# Patient Record
Sex: FEMALE
[De-identification: names, ages, dates, MRNs, and addresses within clinical notes are randomized per-mention and may not be internally consistent; named-entity substitution may affect disease eponyms.]

---

## 2019-06-15 ENCOUNTER — HOSPITAL ENCOUNTER (INPATIENT)
Dept: HOSPITAL 92 - ERS | Age: 69
LOS: 6 days | Discharge: NURSING, REHAB OR LONG-TERM CARE | DRG: 482 | End: 2019-06-21
Attending: SURGERY | Admitting: SURGERY
Payer: SELF-PAY

## 2019-06-15 VITALS — BODY MASS INDEX: 31.8 KG/M2

## 2019-06-15 DIAGNOSIS — R11.2: ICD-10-CM

## 2019-06-15 DIAGNOSIS — R42: ICD-10-CM

## 2019-06-15 DIAGNOSIS — Z90.49: ICD-10-CM

## 2019-06-15 DIAGNOSIS — I10: ICD-10-CM
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
## 2019-06-15 DIAGNOSIS — Z85.038: ICD-10-CM
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
## 2019-06-15 DIAGNOSIS — Z96.653: ICD-10-CM

## 2019-06-15 DIAGNOSIS — S72.351A: Primary | ICD-10-CM

## 2019-06-15 LAB
ANION GAP SERPL CALC-SCNC: 19 MMOL/L (ref 10–20)
APTT PPP: 26.3 SEC (ref 22.9–36.1)
BASOPHILS # BLD AUTO: 0 THOU/UL (ref 0–0.2)
BASOPHILS NFR BLD AUTO: 0.2 % (ref 0–1)
BUN SERPL-MCNC: 20 MG/DL (ref 9.8–20.1)
CALCIUM SERPL-MCNC: 9.3 MG/DL (ref 7.8–10.44)
CHLORIDE SERPL-SCNC: 105 MMOL/L (ref 98–107)
CO2 SERPL-SCNC: 19 MMOL/L (ref 23–31)
CREAT CL PREDICTED SERPL C-G-VRATE: 0 ML/MIN (ref 70–130)
EOSINOPHIL # BLD AUTO: 0.1 THOU/UL (ref 0–0.7)
EOSINOPHIL NFR BLD AUTO: 0.5 % (ref 0–10)
GLUCOSE SERPL-MCNC: 128 MG/DL (ref 80–115)
HGB BLD-MCNC: 9.3 G/DL (ref 12–16)
INR PPP: 1
LYMPHOCYTES # BLD: 1.9 THOU/UL (ref 1.2–3.4)
LYMPHOCYTES NFR BLD AUTO: 10.2 % (ref 21–51)
MCH RBC QN AUTO: 20 PG (ref 27–31)
MCV RBC AUTO: 65.7 FL (ref 78–98)
MDIFF COMPLETE?: YES
MICROCYTES BLD QL SMEAR: (no result) (100X)
MONOCYTES # BLD AUTO: 1.3 THOU/UL (ref 0.11–0.59)
MONOCYTES NFR BLD AUTO: 7 % (ref 0–10)
NEUTROPHILS # BLD AUTO: 15.2 THOU/UL (ref 1.4–6.5)
NEUTROPHILS NFR BLD AUTO: 82.1 % (ref 42–75)
PLATELET # BLD AUTO: 435 THOU/UL (ref 130–400)
POTASSIUM SERPL-SCNC: 3.9 MMOL/L (ref 3.5–5.1)
PROTHROMBIN TIME: 13.7 SEC (ref 12–14.7)
RBC # BLD AUTO: 4.65 MILL/UL (ref 4.2–5.4)
SODIUM SERPL-SCNC: 139 MMOL/L (ref 136–145)
WBC # BLD AUTO: 18.6 THOU/UL (ref 4.8–10.8)

## 2019-06-15 PROCEDURE — 0QH836Z INSERTION OF INTRAMEDULLARY INTERNAL FIXATION DEVICE INTO RIGHT FEMORAL SHAFT, PERCUTANEOUS APPROACH: ICD-10-PCS | Performed by: ORTHOPAEDIC SURGERY

## 2019-06-15 PROCEDURE — 85730 THROMBOPLASTIN TIME PARTIAL: CPT

## 2019-06-15 PROCEDURE — 84100 ASSAY OF PHOSPHORUS: CPT

## 2019-06-15 PROCEDURE — 85610 PROTHROMBIN TIME: CPT

## 2019-06-15 PROCEDURE — 86900 BLOOD TYPING SEROLOGIC ABO: CPT

## 2019-06-15 PROCEDURE — 96375 TX/PRO/DX INJ NEW DRUG ADDON: CPT

## 2019-06-15 PROCEDURE — 93005 ELECTROCARDIOGRAM TRACING: CPT

## 2019-06-15 PROCEDURE — 80048 BASIC METABOLIC PNL TOTAL CA: CPT

## 2019-06-15 PROCEDURE — 83735 ASSAY OF MAGNESIUM: CPT

## 2019-06-15 PROCEDURE — 86901 BLOOD TYPING SEROLOGIC RH(D): CPT

## 2019-06-15 PROCEDURE — P9016 RBC LEUKOCYTES REDUCED: HCPCS

## 2019-06-15 PROCEDURE — 96374 THER/PROPH/DIAG INJ IV PUSH: CPT

## 2019-06-15 PROCEDURE — 71045 X-RAY EXAM CHEST 1 VIEW: CPT

## 2019-06-15 PROCEDURE — 86850 RBC ANTIBODY SCREEN: CPT

## 2019-06-15 PROCEDURE — 51702 INSERT TEMP BLADDER CATH: CPT

## 2019-06-15 PROCEDURE — 85025 COMPLETE CBC W/AUTO DIFF WBC: CPT

## 2019-06-15 PROCEDURE — C1769 GUIDE WIRE: HCPCS

## 2019-06-15 PROCEDURE — 36430 TRANSFUSION BLD/BLD COMPNT: CPT

## 2019-06-15 PROCEDURE — 87086 URINE CULTURE/COLONY COUNT: CPT

## 2019-06-15 PROCEDURE — 76000 FLUOROSCOPY <1 HR PHYS/QHP: CPT

## 2019-06-15 PROCEDURE — G0390 TRAUMA RESPONS W/HOSP CRITI: HCPCS

## 2019-06-15 PROCEDURE — 36415 COLL VENOUS BLD VENIPUNCTURE: CPT

## 2019-06-15 PROCEDURE — C1713 ANCHOR/SCREW BN/BN,TIS/BN: HCPCS

## 2019-06-15 NOTE — RAD
PORTABLE CHEST:

6/15/19

 

HISTORY: 

Preoperative evaluation.

 

COMPARISON: 

5/20/12.

 

Heart is mildly enlarged. There is mild vascular engorgement, similar to the prior exam. No infiltrat
e or significant effusion. 

 

IMPRESSION: 

Cardiomegaly with mild vascular engorgement. No acute process apparent. 

 

POS: [facility]

## 2019-06-15 NOTE — RAD
RIGHT FEMUR:

6/15/19

 

Two views.

 

HISTORY: 

Fall.

 

FINDINGS/IMPRESSION: 

There is a comminuted displaced fracture involving the proximal diaphysis of the femur. 

 

POS: [facility]

## 2019-06-16 RX ADMIN — DOCUSATE SODIUM 50 MG AND SENNOSIDES 8.6 MG SCH TAB: 8.6; 5 TABLET, FILM COATED ORAL at 20:34

## 2019-06-16 RX ADMIN — Medication SCH MG: at 08:42

## 2019-06-16 RX ADMIN — DOCUSATE SODIUM 50 MG AND SENNOSIDES 8.6 MG SCH TAB: 8.6; 5 TABLET, FILM COATED ORAL at 08:42

## 2019-06-16 RX ADMIN — CEFAZOLIN SODIUM SCH MLS: 2 SOLUTION INTRAVENOUS at 17:53

## 2019-06-16 NOTE — RAD
TWO VIEWS OF THE RIGHT FEMUR:

 

COMPARISON: 

6/15/2019.

 

HISTORY: 

Femur fracture.

 

FINDINGS/IMPRESSION: 

Limited intraoperative fluoroscopic views of the right femur were submitted for interpretation.  The 
patient is status post antegrade intramedullary hamzah fixation spanning the mid femur fracture.  A pros
thesis is also seen in the knee.

 

POS: [facility]

## 2019-06-16 NOTE — HP
TRAUMA SURGEON:  Dr. Hogue.



CONSULTING PHYSICIAN:  Dr. Andrew.



HISTORY OF PRESENT ILLNESS:  The patient is a 68-year-old female, who presented to

the emergency department via EMS after a slip on some water at the mall where she

fell, there was no loss of consciousness.  She complained of right thigh pain.  On

evaluation by the emergency department, it was noted that she had a right midshaft

femur fracture.  She denies numbness or tingling.  Denies nausea, vomiting, and

diarrhea.  Reports pain is well controlled whenever she does not move. 



REVIEW OF SYSTEMS:  All additional 10-point review of systems negative except as

indicated above. 



PAST MEDICAL HISTORY:  Hypertension and colon cancer, for which she had surgery, but

no chemo or radiation. 



PAST SURGICAL HISTORY:  Bilateral knee replacements and partial colectomy for colon

cancer. 



SOCIAL HISTORY:  The patient denies tobacco, drug, or alcohol use.  She is from

[place] and does not speak English well.  Her family is very involved in her care. 



MEDICATIONS:  Lisinopril 20 mg once a day.



ALLERGIES:  NO KNOWN DRUG ALLERGIES.



PHYSICAL EXAMINATION:

VITAL SIGNS:  Temperature 98.6, pulse 73, respirations 18, oxygen saturation 100% on

room air, blood pressure 138/72. 



PRIMARY SURVEY: 

Airway intact. 

Adequate breath sounds bilaterally. 

2+ pulses in the bilateral radials, femorals, and DPs. 

GCS is 15.  Gross motor and sensation intact. 

No laceration, bruising, or external bleeding. 



SECONDARY SURVEY: 

HEAD:  Normocephalic and atraumatic.  No gross palpable skull deformities. 

EYES:  Pupils are equal, round, and reactive to light bilaterally. 

ENT:  No hemotympanum.  No epistaxis.  No septal hematoma.  Midface stable to

manipulation.  No blood in the oropharynx.  Dentition is intact.  No anterior neck

injury/crepitus/tenderness. 

C-SPINE:  No step-offs or deformities, nontender.  C-collar not in place. 

CHEST:  Nontender.  No crepitus.  No abrasions or ecchymosis.  Equal movement. 

ABDOMEN:  Soft, nontender, and nondistended. 

PELVIS:  Stable to palpation, nontender.  No abrasions or ecchymosis. 

RECTAL:  Deferred. 

GENITOURINARY:  Allison in place with clear yellow urine in bag. 

EXTREMITIES:  No gross deformities.  Tenderness to the right thigh.  No abrasions or

ecchymosis.  2+ pulses in the bilateral radials, femorals, and DPs. 

BACK/SPINE:  No step-offs, deformities, or tenderness to palpation of her T or

L-spine. 

NEUROLOGIC:  5/5 strength in the bilateral , plantar flexion, and dorsiflexion.

 Gross normal sensation x4 extremities. 



LABORATORY FINDINGS:  White count 18.6, hemoglobin 9.3, hematocrit 30.5, platelets

435.  INR 1.0.  Sodium 138, potassium 3.9, chloride 105, carbon dioxide 19, BUN 20,

creatinine 0.83, glucose 128. 



DIAGNOSTIC FINDINGS:  X-ray of the right femur demonstrates there is a comminuted

displaced fracture involving the proximal diaphysis of the femur.  Chest x-ray

demonstrates cardiomegaly with mild vascular engorgement, no acute process apparent. 



ASSESSMENT:  

1. A 68-year-old status post mechanical fall from standing.

2. Right midshaft femur fracture.

3. History of colon cancer and hypertension.



PLAN:  The patient will be admitted to the trauma floor service.  Dr. Andrew of

Orthopedic Surgery was consulted and saw the patient.  We will plan to take the

patient to the OR tomorrow morning.  She will be n.p.o. after midnight.  She will

receive IV and oral pain medications.  We will not give her IV fluids at this time

due to mild vascular engorgement seen on the chest x-ray, but we will closely

monitor her urinary output.  We will restart her home medications as clinically

indicated.  The patient to work with PT and OT postoperatively.  She will likely

need placement at acute rehab as she ambulates independently and has a full active

life before this accident.  The patient was discussed with Dr. Hogue before this

dictation. 







Job ID:  [id number]

## 2019-06-16 NOTE — PRG
DATE OF SERVICE:  06/16/2019



SUBJECTIVE:  The patient was seen this morning, lying in bed.  Right lower extremity

with traction at the ankle.  The patient reported she did not have any pain as long

as she laid still.  She has been n.p.o. for operative fixation today with Dr. Andrew of the right midshaft femur fracture.  The patient was tearful this

morning and is worried about her recovery.  She has had bilateral knee replacements

before and understands the difficulty with therapy afterwards, and she is a little

apprehensive, but reports that she will feel better after the surgery has been

completed.  She is of  descent and does not speak English well, but family at

bedside has been easily translating for us.  Family is very involved in the

patient's care as well.  She denies nausea, vomiting, or diarrhea. 



OBJECTIVE:  VITAL SIGNS:  Temperature 98.7, pulse 79, respirations 16, oxygen

saturation 97% on room air, and blood pressure 145/79. 

GENERAL:  Well-appearing elderly female, lying in bed with no signs of acute

distress. 

PULMONARY:  Equal chest rise and fall.  Clear breath sounds bilaterally.  No signs

of acute respiratory distress. 

CARDIAC:  Regular rate and rhythm.  No murmurs, gallops, or rubs. 

GI:  Abdomen is soft, nontender, nondistended. 

EXTREMITIES:  2+ pulses in all extremities.  No significant swelling noted.  Gross

motor and sensation intact in all extremities. 

NEUROLOGIC:  GCS is 15.  Gross motor and sensation intact.



LABORATORY FINDINGS:  There are no new laboratory findings to discuss.



ASSESSMENT:  

1. Status post mechanical fall from standing.

2. Right femoral neck fracture.

3. History of hypertension and colon cancer.



PLAN:  The patient is to go to the OR today with Dr. Andrew of Orthopedic Surgery

for fixation of the right midshaft femur fracture.  The patient is to receive

physical and 

occupational therapy afterwards.  She can have a regular diet, and she does not need

any IV fluids.  We will hold chemo-DVT prophylaxis until tomorrow.  She will likely

need 

placement in a rehab facility for further therapy.  The patient was discussed with

Dr. Hogue this morning after rounds. 







Job ID:  [id number]

## 2019-06-16 NOTE — CON
DATE OF CONSULTATION:  



CHIEF COMPLAINT:  Right leg pain.



HISTORY OF PRESENT ILLNESS:  Ms. Greenberg is a 68-year-old female, who [place]

[place]all today.  She slipped and fell.  She landed hard on her right leg.  She had pain

and swelling.  She was unable to ambulate.  She was taken to the emergency

department by EMS.  She was found to have a comminuted and displaced midshaft femur

fracture.  Orthopedics was consulted for this injury.  This appears to be her only

injury.  She does not have other complaints.  At baseline, she ambulates well with

no walker.  She does have a history of knee arthroplasty bilaterally.  She has been

in good health.  She does not remember loss of consciousness or dizziness. 



PAST MEDICAL HISTORY:  Hypertension and history of colon cancer.



PAST SURGICAL HISTORY:  Colon cancer treatment.



ALLERGIES:  NO KNOWN DRUG ALLERGIES.



MEDICATIONS:  She takes a hypertension medication.  No blood thinners.



FAMILY HISTORY:  Family medical history; noncontributory.



SOCIAL HISTORY:  The patient denies tobacco, alcohol, or drug use.  She lives with

family. 



PHYSICAL EXAMINATION:

VITAL SIGNS:  Stable.  She is afebrile.  Alert and oriented, no apparent distress. 

RESPIRATORY:  Breathing comfortably. 

ABDOMEN:  Soft, nontender, and nondistended. 

MUSCULOSKELETAL:  The right leg is in an externally rotated and shortened position.

She has a well-healed scar over the anterior knee.  She is neurovascularly intact in

the foot and ankle.  She has a palpable pulse.  Left leg is atraumatic as well as

upper extremities. 



IMAGING:  X-rays of the right femur demonstrate a comminuted and displaced midshaft

femur fracture with shortening.  There is a total knee arthroplasty that appears to

be intact and well-fixed. 



IMPRESSION:  Right midshaft femur fracture in an elderly female after a fall.



PLAN:  The patient will be admitted to the [facility].  She will need to go to

the operating room tomorrow morning for intramedullary nail fixation of her femur.

I think we can plan for intramedullary nail from the antegrade position and stay

away from her total knee arthroplasty.  The knee replacement appears to be intact.

The goal of surgery is to provide pain relief, realign the fracture, and promote

healing.  Risks to include infection, pain, scarring, nerve or vascular injury, DVT,

PE, hardware failure, nonunion, and others.  She should be n.p.o. at midnight.  She

will have appropriate DVT prophylaxis and antibiotic prophylaxis. 







Job ID:  [id number]

## 2019-06-17 LAB
ANION GAP SERPL CALC-SCNC: 9 MMOL/L (ref 10–20)
BASOPHILS # BLD AUTO: 0 THOU/UL (ref 0–0.2)
BASOPHILS NFR BLD AUTO: 0.1 % (ref 0–1)
BUN SERPL-MCNC: 16 MG/DL (ref 9.8–20.1)
CALCIUM SERPL-MCNC: 8.5 MG/DL (ref 7.8–10.44)
CHLORIDE SERPL-SCNC: 109 MMOL/L (ref 98–107)
CO2 SERPL-SCNC: 26 MMOL/L (ref 23–31)
CREAT CL PREDICTED SERPL C-G-VRATE: 83 ML/MIN (ref 70–130)
EOSINOPHIL # BLD AUTO: 0 THOU/UL (ref 0–0.7)
EOSINOPHIL NFR BLD AUTO: 0.2 % (ref 0–10)
GLUCOSE SERPL-MCNC: 128 MG/DL (ref 80–115)
HGB BLD-MCNC: 7.4 G/DL (ref 12–16)
LYMPHOCYTES # BLD: 1.5 THOU/UL (ref 1.2–3.4)
LYMPHOCYTES NFR BLD AUTO: 14.1 % (ref 21–51)
MAGNESIUM SERPL-MCNC: 1.9 MG/DL (ref 1.6–2.6)
MCH RBC QN AUTO: 20.1 PG (ref 27–31)
MCV RBC AUTO: 65.5 FL (ref 78–98)
MONOCYTES # BLD AUTO: 1 THOU/UL (ref 0.11–0.59)
MONOCYTES NFR BLD AUTO: 9.1 % (ref 0–10)
NEUTROPHILS # BLD AUTO: 8.3 THOU/UL (ref 1.4–6.5)
NEUTROPHILS NFR BLD AUTO: 76.5 % (ref 42–75)
PLATELET # BLD AUTO: 383 THOU/UL (ref 130–400)
POTASSIUM SERPL-SCNC: 4.3 MMOL/L (ref 3.5–5.1)
RBC # BLD AUTO: 3.66 MILL/UL (ref 4.2–5.4)
SODIUM SERPL-SCNC: 140 MMOL/L (ref 136–145)
WBC # BLD AUTO: 10.8 THOU/UL (ref 4.8–10.8)

## 2019-06-17 RX ADMIN — CEFAZOLIN SODIUM SCH MLS: 2 SOLUTION INTRAVENOUS at 02:47

## 2019-06-17 RX ADMIN — DOCUSATE SODIUM 50 MG AND SENNOSIDES 8.6 MG SCH TAB: 8.6; 5 TABLET, FILM COATED ORAL at 21:03

## 2019-06-17 RX ADMIN — DOCUSATE SODIUM 50 MG AND SENNOSIDES 8.6 MG SCH TAB: 8.6; 5 TABLET, FILM COATED ORAL at 08:51

## 2019-06-17 RX ADMIN — Medication SCH MG: at 08:51

## 2019-06-17 NOTE — HP
ADDENDUM:  For full details, please see the H and P dictated by Martine Barreto

trauma PA.  I verified the details with this and discussed the diagnosis and

treatment plan with Ms. Barreto and agree with that plan as documented in her

note.  In short, Ms. Greenberg is a 68-year-old woman, who slipped on a wet floor at

the mall and fell breaking her right femur.  She has already been to the operating

room and I saw her in recovery.  Complete examination performed personally did not

reveal any other evidence of trauma nor was any reported.  She has a past medical

history of colon cancer and has had a colectomy for this, but this does not appear

to be a pathologic fracture.  She will be seen and evaluated by Physical Therapy and

have a rehab screening for inpatient rehab. 







Job ID:  [id number]

## 2019-06-17 NOTE — PRG
DATE OF SERVICE:  06/17/2019



SUBJECTIVE:  Ms. Greenberg is a 68-year-old female, presenting with right hip fracture

after a mechanical fall.  She is postop day #1, status post intramedullary nail by

Dr. Andrew.  Reports she needs more pain medications for adequate pain control,

currently tramadol is p.r.n. and she has not been asking for a p.r.n.  She is

tolerating regular diet, still has Allison catheter in place.  Denies nausea or

vomiting. 



OBJECTIVE:  VITAL SIGNS:  Blood pressure 123/66, temperature 98.3, pulse 83,

respirations 18, and SpO2 is 96% on room air. 

GENERAL:  Alert and oriented x3, in no acute distress. 

NECK:  Supple.  Trachea midline. 

RESPIRATORY:  Clear to auscultation bilaterally.  No respiratory distress. 

CARDIAC:  Regular rate and rhythm.  No murmurs. 

ABDOMEN: Soft, nontender, nondistended.  Bowel sounds present. 

EXTREMITIES:  Radial pulses 2+.  No gross deformities.



LABORATORY FINDINGS:  White blood cell count 10.8, hemoglobin 7.4, and MCV 65.5.

Glucose 128. 



DIAGNOSTIC FINDINGS:  There are no new diagnostics to review.



ASSESSMENT:  

1. Mechanical fall from standing.

2. Right midshaft femur fracture.

3. History of colon cancer.

4. Hypertension.



PLAN:  The patient is postop day #1 status post intramedullary nail placement by Dr. Andrew, Orthopedic Surgery.  We will schedule the patient's tramadol and have

p.r.n. available to better control patient's pain.  Tylenol is now scheduled p.o.

We will start Lovenox for DVT prophylaxis.  We will also remove the Allison today and

monitor for urinary output with Is and Os.  She is currently on senna and MiraLAX

for bowel regimen.  She has not had a bowel movement since admission.  We will

restart her home medications as indicated, blood pressure is currently controlled

off antihypertensives.  The patient will continue to work with PT and OT.  Referral

was sent to [facility] for rehab. 



This patient was seen and evaluated by Dr. Mejía during morning rounds.  The plan

was discussed with the patient and family, who are in agreement. 







Job ID:  [id number]

## 2019-06-17 NOTE — RAD
RIGHT FEMUR:

6/15/19

 

Four views.

 

INDICATIONS:

Trauma. 

 

There is a comminuted displaced fracture involving the  mid shaft of the femur. 

 

Prosthesis noted at the knee which appears unremarkable. Hip appears unremarkable. 

 

IMPRESSION: 

Comminuted displaced fracture mid shaft right femur. 

 

POS: [facility]

## 2019-06-18 LAB
ANION GAP SERPL CALC-SCNC: 10 MMOL/L (ref 10–20)
BASOPHILS # BLD AUTO: 0.1 THOU/UL (ref 0–0.2)
BASOPHILS NFR BLD AUTO: 0.6 % (ref 0–1)
BUN SERPL-MCNC: 21 MG/DL (ref 9.8–20.1)
CALCIUM SERPL-MCNC: 8.3 MG/DL (ref 7.8–10.44)
CHLORIDE SERPL-SCNC: 110 MMOL/L (ref 98–107)
CO2 SERPL-SCNC: 23 MMOL/L (ref 23–31)
CREAT CL PREDICTED SERPL C-G-VRATE: 85 ML/MIN (ref 70–130)
EOSINOPHIL # BLD AUTO: 0.6 THOU/UL (ref 0–0.7)
EOSINOPHIL NFR BLD AUTO: 6.3 % (ref 0–10)
GLUCOSE SERPL-MCNC: 105 MG/DL (ref 80–115)
HGB BLD-MCNC: 6.9 G/DL (ref 12–16)
LYMPHOCYTES # BLD: 3.2 THOU/UL (ref 1.2–3.4)
LYMPHOCYTES NFR BLD AUTO: 31.4 % (ref 21–51)
MAGNESIUM SERPL-MCNC: 1.9 MG/DL (ref 1.6–2.6)
MCH RBC QN AUTO: 19.9 PG (ref 27–31)
MCV RBC AUTO: 66 FL (ref 78–98)
MONOCYTES # BLD AUTO: 0.9 THOU/UL (ref 0.11–0.59)
MONOCYTES NFR BLD AUTO: 8.6 % (ref 0–10)
NEUTROPHILS # BLD AUTO: 5.4 THOU/UL (ref 1.4–6.5)
NEUTROPHILS NFR BLD AUTO: 53.1 % (ref 42–75)
PLATELET # BLD AUTO: 348 THOU/UL (ref 130–400)
POTASSIUM SERPL-SCNC: 4 MMOL/L (ref 3.5–5.1)
RBC # BLD AUTO: 3.45 MILL/UL (ref 4.2–5.4)
SODIUM SERPL-SCNC: 139 MMOL/L (ref 136–145)
WBC # BLD AUTO: 10.1 THOU/UL (ref 4.8–10.8)

## 2019-06-18 PROCEDURE — 30233N1 TRANSFUSION OF NONAUTOLOGOUS RED BLOOD CELLS INTO PERIPHERAL VEIN, PERCUTANEOUS APPROACH: ICD-10-PCS | Performed by: SURGERY

## 2019-06-18 RX ADMIN — DOCUSATE SODIUM 50 MG AND SENNOSIDES 8.6 MG SCH TAB: 8.6; 5 TABLET, FILM COATED ORAL at 21:05

## 2019-06-18 RX ADMIN — ONDANSETRON PRN MG: 2 INJECTION INTRAMUSCULAR; INTRAVENOUS at 09:05

## 2019-06-18 RX ADMIN — DOCUSATE SODIUM 50 MG AND SENNOSIDES 8.6 MG SCH TAB: 8.6; 5 TABLET, FILM COATED ORAL at 09:17

## 2019-06-18 RX ADMIN — ONDANSETRON PRN MG: 2 INJECTION INTRAMUSCULAR; INTRAVENOUS at 18:43

## 2019-06-18 RX ADMIN — SCOPALAMINE PRN MG: 1 PATCH, EXTENDED RELEASE TRANSDERMAL at 21:06

## 2019-06-18 RX ADMIN — Medication SCH MG: at 09:15

## 2019-06-18 NOTE — PRG
DATE OF SERVICE:  06/18/2019



SUBJECTIVE:  Ms. Greenberg is a 68-year-old female, who presented with right hip

fracture after mechanical fall.  She is postop day #2, status post intramedullary

nail by Dr. Andrew.  She reports pain is now adequately controlled, but she does

endorse some throat soreness post extubation from surgery.  She is tolerating

regular diet.  Allison catheter was removed yesterday and she is urinating.  Her

hemoglobin was noted to be 6.9 this morning and she will have a transfusion, and 1

unit of packed red blood cells was ordered, but she is experiencing dizziness and

weakness.  She has been attempting to use her incentive spirometry, but is too dizzy

right now to use it.  Teaching was given on how to use the incentive spirometer as

it appeared she was having trouble using it. 



OBJECTIVE:  VITAL SIGNS:  Blood pressure 155/87, temperature 97.6, pulse 99,

respiratory rate 18, and SpO2 of 96% on room air. 

GENERAL:  Alert and oriented x3, in some distress due to dizziness. 

NECK:  Supple.  Trachea midline. 

RESPIRATORY:  Diffuse expiratory wheezing.  No respiratory distress. 

CARDIAC:  Regular rate and rhythm.  No murmurs. 

ABDOMEN:  Soft, nontender, and nondistended.  Bowel sounds present. 

EXTREMITIES:  Radial pulse 2+.  No gross deformities. 

SKIN:  Dressing over incision site.



LABORATORY FINDINGS:  Hemoglobin 6.9 and white blood cell count 10.1.



DIAGNOSTIC FINDINGS:  No new diagnostics to review.



ASSESSMENT:  

1. Mechanical fall from standing.

2. Right midshaft femur fracture.

3. History of colon cancer.

4. Hypertension.



PLAN:  The patient is postop day #2 status post intramedullary nail placement by Dr. Andrew, Orthopedic Surgery.  The patient's pain is now well controlled with the

addition of scheduled tramadol.  She is currently on Lovenox for DVT prophylaxis.

Allison was removed yesterday and she is urinating on her own.  She is currently on

senna and MiraLAX for bowel regimen and has not had bowel movement since admission.

We will restart her home medications as indicated, blood pressure is currently

controlled off antihypertensives.  The patient will continue to work with PT and OT.

 Referral was sent for [facility].  She will receive 1 unit of packed red blood

cells today for symptomatic anemia with hemoglobin of 6.9.  Encourage her to use

incentive spirometer. 



The patient was seen and evaluated by Dr. Mejía during morning rounds.  Plan was

discussed with the patient and family, who are in agreement. 







Job ID:  [id number]

## 2019-06-19 LAB
ANION GAP SERPL CALC-SCNC: 12 MMOL/L (ref 10–20)
BASOPHILS # BLD AUTO: 0.1 THOU/UL (ref 0–0.2)
BASOPHILS NFR BLD AUTO: 0.5 % (ref 0–1)
BUN SERPL-MCNC: 12 MG/DL (ref 9.8–20.1)
CALCIUM SERPL-MCNC: 8.8 MG/DL (ref 7.8–10.44)
CHLORIDE SERPL-SCNC: 106 MMOL/L (ref 98–107)
CO2 SERPL-SCNC: 24 MMOL/L (ref 23–31)
CREAT CL PREDICTED SERPL C-G-VRATE: 98 ML/MIN (ref 70–130)
EOSINOPHIL # BLD AUTO: 0.5 THOU/UL (ref 0–0.7)
EOSINOPHIL NFR BLD AUTO: 3.7 % (ref 0–10)
GLUCOSE SERPL-MCNC: 137 MG/DL (ref 80–115)
HGB BLD-MCNC: 8.7 G/DL (ref 12–16)
LYMPHOCYTES # BLD: 2.3 THOU/UL (ref 1.2–3.4)
LYMPHOCYTES NFR BLD AUTO: 18.6 % (ref 21–51)
MAGNESIUM SERPL-MCNC: 1.8 MG/DL (ref 1.6–2.6)
MCH RBC QN AUTO: 21.2 PG (ref 27–31)
MCV RBC AUTO: 66.5 FL (ref 78–98)
MONOCYTES # BLD AUTO: 0.8 THOU/UL (ref 0.11–0.59)
MONOCYTES NFR BLD AUTO: 6.2 % (ref 0–10)
NEUTROPHILS # BLD AUTO: 8.7 THOU/UL (ref 1.4–6.5)
NEUTROPHILS NFR BLD AUTO: 71 % (ref 42–75)
PLATELET # BLD AUTO: 398 THOU/UL (ref 130–400)
POTASSIUM SERPL-SCNC: 4 MMOL/L (ref 3.5–5.1)
RBC # BLD AUTO: 4.09 MILL/UL (ref 4.2–5.4)
SODIUM SERPL-SCNC: 138 MMOL/L (ref 136–145)
WBC # BLD AUTO: 12.2 THOU/UL (ref 4.8–10.8)

## 2019-06-19 RX ADMIN — Medication SCH MG: at 08:38

## 2019-06-19 RX ADMIN — DOCUSATE SODIUM 50 MG AND SENNOSIDES 8.6 MG SCH: 8.6; 5 TABLET, FILM COATED ORAL at 20:04

## 2019-06-19 RX ADMIN — DOCUSATE SODIUM 50 MG AND SENNOSIDES 8.6 MG SCH TAB: 8.6; 5 TABLET, FILM COATED ORAL at 08:40

## 2019-06-19 RX ADMIN — ONDANSETRON PRN MG: 2 INJECTION INTRAMUSCULAR; INTRAVENOUS at 05:02

## 2019-06-19 NOTE — PRG
DATE OF SERVICE:  06/19/2019



SUBJECTIVE:  Ms. Greenberg is a 68-year-old female, who presented with right hip

fracture after mechanical fall.  She is postop day #3, status post intramedullary

nail by Dr. Andrew.  She reports pain is adequately controlled and is tolerating

a regular diet.  She is experiencing dizziness like the room is spinning.  Denies

any diplopia.  She received a transfusion of 1 unit packed red blood cells yesterday

after her hemoglobin was noted to be 6.9.  She said this did improve her symptoms of

blacking out and improved her dizziness a little bit.  The scopolamine patch also of

benefit this morning. 



OBJECTIVE:  VITAL SIGNS:  Blood pressure 148/82, temperature 99.3, pulse 93,

respirations 20, and SpO2 97% on room air. 

GENERAL:  Alert and oriented, fatigued this morning after dose of Phenergan. 

HEENT:  Normocephalic and atraumatic. 

NECK:  Supple.  Trachea midline. 

RESPIRATORY:  No respiratory distress. 

ABDOMEN:  Soft, nontender, and nondistended.  Bowel sounds present. 

EXTREMITIES:  Radial pulses 2+.  No gross deformities. 

SKIN:  Dressing over incision site.



LABORATORY DATA:  White blood cell count 12.2, hemoglobin 8.7, hematocrit 27.2,

glucose 137, phosphorus 2.0, and magnesium 1.8. 



DIAGNOSTIC IMAGING:  No new diagnostics to review.



ASSESSMENT:  

1. Mechanical fall from standing.

2. Right midshaft femur fracture.

3. History of colon cancer.

4. Dizziness.

5. Hypertension.



PLAN:  The patient is postop day #3 status post intramedullary nail placement by Dr. Andrew, Orthopedic Surgery.  The patient's pain is well controlled and she is

currently on Lovenox for DVT prophylaxis.  She is receiving senna and MiraLAX for

bowel regimen and had 2 bowel movements overnight.  She is currently on scopolamine

patch for dizziness.  This has given her some improvement, but will add meclizine

p.r.n. in addition to scopolamine.  We will discontinue Phenergan due to little

added benefit and the patient's drowsiness making it more difficult for her to

participate in PT and OT. 



We will restart her home medications as her blood pressure was elevated today.  The

patient will continue to work with PT and OT.  Referral was sent for [facility].  Case Management has been contacting the mall regarding insurance coverage as

the patient slipped and fell on a wet floor in the mall.  Continue to encourage the

patient to use incentive spirometer. 



The patient was seen and evaluated by Dr. Mejía during morning rounds.  Plan was

discussed with the patient and family, who are in agreement with the plan. 







Job ID:  [id number]

## 2019-06-20 LAB
ANION GAP SERPL CALC-SCNC: 11 MMOL/L (ref 10–20)
BASOPHILS # BLD AUTO: 0.1 THOU/UL (ref 0–0.2)
BASOPHILS NFR BLD AUTO: 0.5 % (ref 0–1)
BUN SERPL-MCNC: 12 MG/DL (ref 9.8–20.1)
CALCIUM SERPL-MCNC: 8.7 MG/DL (ref 7.8–10.44)
CHLORIDE SERPL-SCNC: 107 MMOL/L (ref 98–107)
CO2 SERPL-SCNC: 25 MMOL/L (ref 23–31)
CREAT CL PREDICTED SERPL C-G-VRATE: 102 ML/MIN (ref 70–130)
EOSINOPHIL # BLD AUTO: 0.6 THOU/UL (ref 0–0.7)
EOSINOPHIL NFR BLD AUTO: 6.1 % (ref 0–10)
GLUCOSE SERPL-MCNC: 90 MG/DL (ref 80–115)
HGB BLD-MCNC: 7.9 G/DL (ref 12–16)
LYMPHOCYTES # BLD: 2.1 THOU/UL (ref 1.2–3.4)
LYMPHOCYTES NFR BLD AUTO: 20.3 % (ref 21–51)
MAGNESIUM SERPL-MCNC: 2 MG/DL (ref 1.6–2.6)
MCH RBC QN AUTO: 20.8 PG (ref 27–31)
MCV RBC AUTO: 67.4 FL (ref 78–98)
MICROCYTES BLD QL SMEAR: (no result) (100X)
MONOCYTES # BLD AUTO: 0.8 THOU/UL (ref 0.11–0.59)
MONOCYTES NFR BLD AUTO: 7.6 % (ref 0–10)
NEUTROPHILS # BLD AUTO: 6.9 THOU/UL (ref 1.4–6.5)
NEUTROPHILS NFR BLD AUTO: 65.5 % (ref 42–75)
PLATELET # BLD AUTO: 365 THOU/UL (ref 130–400)
POLYCHROMASIA BLD QL SMEAR: (no result) (100X)
POTASSIUM SERPL-SCNC: 4 MMOL/L (ref 3.5–5.1)
RBC # BLD AUTO: 3.8 MILL/UL (ref 4.2–5.4)
SCHISTOCYTES BLD QL SMEAR: (no result) (100X)
SODIUM SERPL-SCNC: 139 MMOL/L (ref 136–145)
WBC # BLD AUTO: 10.5 THOU/UL (ref 4.8–10.8)

## 2019-06-20 RX ADMIN — DOCUSATE SODIUM 50 MG AND SENNOSIDES 8.6 MG SCH TAB: 8.6; 5 TABLET, FILM COATED ORAL at 20:59

## 2019-06-20 RX ADMIN — DOCUSATE SODIUM 50 MG AND SENNOSIDES 8.6 MG SCH: 8.6; 5 TABLET, FILM COATED ORAL at 10:11

## 2019-06-20 RX ADMIN — Medication SCH MG: at 09:18

## 2019-06-20 NOTE — PRG
DATE OF SERVICE:  06/20/2019



SUBJECTIVE:  Ms. Greenberg is a 68-year-old female, presenting with right hip fracture

after mechanical fall.  She is postop day #4, status post intramedullary nail by Dr. Andrew.  Reports pain is well controlled and tolerating regular diet.  Her

dizziness is very mild and it has improved with scopolamine patch and Antivert.

This has not been preventing her from working with physical and occupational

therapy.  She is up sitting in her chair. 



OBJECTIVE:  VITAL SIGNS:  Blood pressure 141/81, temperature 97.7, pulse 83,

respirations 14, and SpO2 of 93% on room air. 

GENERAL:  Alert and oriented, in no acute distress, sitting in chair. 

HEENT:  Normocephalic, atraumatic. 

NECK:  Supple.  Trachea midline. 

RESPIRATORY:  Her lungs are clear to auscultation bilaterally.  No respiratory

distress. 

CARDIAC:  Regular rate and rhythm.  No murmurs. 

ABDOMEN:  Soft, nontender, and nondistended.  Bowel sounds present. 

EXTREMITIES:  Radial pulses 2+.  No gross deformities.



LABORATORY DATA:  No new labs to review.



DIAGNOSTIC IMAGING:  No new diagnostics to review.



ASSESSMENT:  

1. Mechanical fall from standing.

2. Right midshaft femur fracture.

3. History of colon cancer.

4. Dizziness.

5. Hypertension.



PLAN:  The patient is postop day #4, status post intramedullary nail placement by

Dr. Andrew, Orthopedic Surgery, after hip fracture.  The patient's pain is well

controlled and she is currently on Lovenox for DVT prophylaxis.  She is receiving

senna and MiraLAX for bowel regimen.  In regard to her dizziness, this is improved

with scopolamine patch and Antivert p.r.n.  She is working well with physical and

occupational therapy, and Case Management is working on approval for [facility]

as the patient's picture is complicated by the accident happening in shopping mall.

We have continued to encourage the patient to use incentive spirometer at least

every hour and to sit up in the chair to help with her breathing and cough. 



The patient was discussed with Dr. Mejía during morning rounds.  The plan was

discussed with the patient and family, who were in agreement with the plan. 







Job ID:  [id number]

## 2019-06-21 VITALS — SYSTOLIC BLOOD PRESSURE: 119 MMHG | DIASTOLIC BLOOD PRESSURE: 73 MMHG | TEMPERATURE: 98.1 F

## 2019-06-21 RX ADMIN — DOCUSATE SODIUM 50 MG AND SENNOSIDES 8.6 MG SCH TAB: 8.6; 5 TABLET, FILM COATED ORAL at 09:58

## 2019-06-21 RX ADMIN — Medication SCH MG: at 09:58

## 2019-06-21 RX ADMIN — SCOPALAMINE PRN MG: 1 PATCH, EXTENDED RELEASE TRANSDERMAL at 11:42

## 2019-06-21 NOTE — DIS
DATE OF ADMISSION:  06/15/2019



DATE OF DISCHARGE:  06/21/2019



ADMISSION DIAGNOSES:  

1. Status post mechanical fall from standing.

2. Right midshaft femur fracture.

3. Acute traumatic pain.

4. History of colon cancer.

5. History of hypertension.



DISCHARGE DIAGNOSES:  

1. Status post mechanical fall from standing.

2. Right midshaft femur fracture.

3. Acute traumatic pain.

4. History of colon cancer.

5. History of hypertension.

6. Vertigo.

7. Acute blood loss anemia.



CONSULTANTS:  Dr. Andrew, Orthopedic Surgery.



HOSPITAL COURSE:  Ms. Greenberg is a 68-year-old female, who presented to [facility]gency room after a slip and fall reportedly on water at the mall.  She was seen

and evaluated and found to have the above injuries.  She was taken for operative

intervention on 06/15/2019.  Postoperatively, the patient did well.  She was

tolerating a general diet.  She was working on physical therapy.  She did have

multiple bouts of nausea and vomiting associated with vertigo for which she was

given scopolamine and Antivert.  This improved her symptoms dramatically.  The

patient was evaluated and accepted to inpatient rehabilitation.  Bed was available

and she was medically stable on 06/21/2019. 



DISCHARGE DISPOSITION:  Inpatient rehab.



DISCHARGE CONDITION:  Good.



PHYSICAL EXAMINATION:

VITAL SIGNS:  Temperature 98.1, pulse 87, respirations 14, O2 saturation 98% on room

air, and blood pressure 119/73. 

GENERAL:  Resting in bed, in no acute distress.  Eating breakfast. 

PULMONARY:  Normal work of breathing.  Symmetric rise. 

CARDIOVASCULAR:  Regular rate and rhythm. 

GI:  Abdomen is soft, nontender, and nondistended. 

MUSCULOSKELETAL:  Moves all extremities x4. 

NEURO:  No focal deficit is noted.



DISCHARGE INSTRUCTIONS:  Discharge instructions were provided to the patient in the

accepting facility.  She is partial weightbearing on her right lower extremity.  She

should keep her orthopedic wounds clean and dry.  Continue to work with Physical

Therapy and Occupational therapy.  She has no dietary restrictions. 



DISCHARGE MEDICATIONS:  As documented in the electronic medical record list, which

was provided to the accepting facility. 



FOLLOWUP APPOINTMENTS:  The patient is follow up with her primary care provider as

needed for chronic medical illnesses to include possible vertigo.  She is to follow

up with Orthopedic Surgery in approximately 10 days.  She does not need to follow up

formally with Trauma Services, but may call our office with any questions.  This is

merely a summary of the patient's hospitalization.  For more in-depth information,

please see her medical record in its entirety. 







Job ID:  [id number]

## 2019-06-22 NOTE — EKG
Test Reason : 

Blood Pressure : ***/*** mmHG

Vent. Rate : 088 BPM     Atrial Rate : 088 BPM

   P-R Int : 134 ms          QRS Dur : 090 ms

    QT Int : 374 ms       P-R-T Axes : 065 023 051 degrees

   QTc Int : 452 ms

 

Normal sinus rhythm

Left atrial enlargement

ST abnormality, possible digitalis effect

Abnormal ECG

 

Confirmed by NUHA LEACH, ALICE RIOS ([id number]),  CHARITY REES ([id number]) on 6/22/2019 11:53:49 AM

 

Referred By:             Confirmed By:ALICE BREEN MD

## 2021-08-23 ENCOUNTER — HOSPITAL ENCOUNTER (EMERGENCY)
Dept: HOSPITAL 92 - CSHERS | Age: 71
Discharge: HOME | End: 2021-08-23
Payer: MEDICARE

## 2021-08-23 DIAGNOSIS — R42: ICD-10-CM

## 2021-08-23 DIAGNOSIS — Z85.038: ICD-10-CM

## 2021-08-23 DIAGNOSIS — R19.7: ICD-10-CM

## 2021-08-23 DIAGNOSIS — R10.84: ICD-10-CM

## 2021-08-23 DIAGNOSIS — R11.10: Primary | ICD-10-CM

## 2021-08-23 DIAGNOSIS — I10: ICD-10-CM

## 2021-08-23 DIAGNOSIS — Z79.899: ICD-10-CM

## 2021-08-23 DIAGNOSIS — Z20.822: ICD-10-CM

## 2021-08-23 LAB
ALBUMIN SERPL BCG-MCNC: 3.7 G/DL (ref 3.4–4.8)
ALP SERPL-CCNC: 72 U/L (ref 40–110)
ALT SERPL W P-5'-P-CCNC: 12 U/L (ref 8–55)
ANION GAP SERPL CALC-SCNC: 15 MMOL/L (ref 10–20)
ANISOCYTOSIS BLD QL SMEAR: (no result) (100X)
AST SERPL-CCNC: 18 U/L (ref 5–34)
BACTERIA UR QL AUTO: (no result) HPF
BASOPHILS # BLD AUTO: 0 10X3/UL (ref 0–0.2)
BASOPHILS NFR BLD AUTO: 0.3 % (ref 0–2)
BILIRUB SERPL-MCNC: 0.3 MG/DL (ref 0.2–1.2)
BUN SERPL-MCNC: 13 MG/DL (ref 9.8–20.1)
CALCIUM SERPL-MCNC: 9 MG/DL (ref 7.8–10.44)
CHLORIDE SERPL-SCNC: 109 MMOL/L (ref 98–107)
CO2 SERPL-SCNC: 18 MMOL/L (ref 23–31)
CREAT CL PREDICTED SERPL C-G-VRATE: 0 ML/MIN (ref 70–130)
EOSINOPHIL # BLD AUTO: 0 10X3/UL (ref 0–0.5)
EOSINOPHIL NFR BLD AUTO: 0.2 % (ref 0–6)
GLOBULIN SER CALC-MCNC: 4.1 G/DL (ref 2.4–3.5)
GLUCOSE SERPL-MCNC: 131 MG/DL (ref 83–110)
HGB BLD-MCNC: 11 G/DL (ref 12–15.5)
LYMPHOCYTES NFR BLD AUTO: 12.1 % (ref 18–47)
MCH RBC QN AUTO: 19.3 PG (ref 27–33)
MCV RBC AUTO: 65.7 FL (ref 81.6–98.3)
MICROCYTES BLD QL SMEAR: (no result) (100X)
MONOCYTES # BLD AUTO: 0.5 10X3/UL (ref 0–1.1)
MONOCYTES NFR BLD AUTO: 4.5 % (ref 0–10)
MUCOUS THREADS UR QL AUTO: (no result) LPF
NEUTROPHILS # BLD AUTO: 8.5 10X3/UL (ref 1.5–8.4)
NEUTROPHILS NFR BLD AUTO: 82.1 % (ref 40–75)
PLATELET # BLD AUTO: 372 10X3/UL (ref 150–450)
POTASSIUM SERPL-SCNC: 4.1 MMOL/L (ref 3.5–5.1)
PROT UR STRIP.AUTO-MCNC: 30 MG/DL
RBC # BLD AUTO: 5.69 10X6/UL (ref 3.9–5.03)
RBC UR QL AUTO: (no result) HPF (ref 0–3)
SODIUM SERPL-SCNC: 138 MMOL/L (ref 136–145)
SP GR UR STRIP: 1.02 (ref 1–1.04)
WBC # BLD AUTO: 10.3 10X3/UL (ref 3.5–10.5)
WBC UR QL AUTO: (no result) HPF (ref 0–3)

## 2021-08-23 PROCEDURE — 81003 URINALYSIS AUTO W/O SCOPE: CPT

## 2021-08-23 PROCEDURE — 93005 ELECTROCARDIOGRAM TRACING: CPT

## 2021-08-23 PROCEDURE — 81015 MICROSCOPIC EXAM OF URINE: CPT

## 2021-08-23 PROCEDURE — U0005 INFEC AGEN DETEC AMPLI PROBE: HCPCS

## 2021-08-23 PROCEDURE — 80053 COMPREHEN METABOLIC PANEL: CPT

## 2021-08-23 PROCEDURE — 36415 COLL VENOUS BLD VENIPUNCTURE: CPT

## 2021-08-23 PROCEDURE — 85025 COMPLETE CBC W/AUTO DIFF WBC: CPT

## 2021-08-23 PROCEDURE — U0003 INFECTIOUS AGENT DETECTION BY NUCLEIC ACID (DNA OR RNA); SEVERE ACUTE RESPIRATORY SYNDROME CORONAVIRUS 2 (SARS-COV-2) (CORONAVIRUS DISEASE [COVID-19]), AMPLIFIED PROBE TECHNIQUE, MAKING USE OF HIGH THROUGHPUT TECHNOLOGIES AS DESCRIBED BY CMS-2020-01-R: HCPCS

## 2023-12-16 ENCOUNTER — HOSPITAL ENCOUNTER (INPATIENT)
Dept: HOSPITAL 92 - ERS | Age: 73
LOS: 6 days | Discharge: HOME | DRG: 481 | End: 2023-12-22
Attending: SURGERY | Admitting: SURGERY
Payer: COMMERCIAL

## 2023-12-16 VITALS — BODY MASS INDEX: 33.6 KG/M2
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
## 2023-12-16 DIAGNOSIS — J98.11: ICD-10-CM

## 2023-12-16 DIAGNOSIS — D62: ICD-10-CM

## 2023-12-16 DIAGNOSIS — R19.7: ICD-10-CM

## 2023-12-16 DIAGNOSIS — Z90.49: ICD-10-CM

## 2023-12-16 DIAGNOSIS — Z79.899: ICD-10-CM

## 2023-12-16 DIAGNOSIS — Z96.653: ICD-10-CM

## 2023-12-16 DIAGNOSIS — S72.22XA: Primary | ICD-10-CM

## 2023-12-16 DIAGNOSIS — E87.70: ICD-10-CM

## 2023-12-16 DIAGNOSIS — K43.9: ICD-10-CM

## 2023-12-16 DIAGNOSIS — F39: ICD-10-CM

## 2023-12-16 DIAGNOSIS — Z85.038: ICD-10-CM

## 2023-12-16 DIAGNOSIS — E86.0: ICD-10-CM

## 2023-12-16 DIAGNOSIS — Z98.890: ICD-10-CM

## 2023-12-16 DIAGNOSIS — I10: ICD-10-CM

## 2023-12-16 LAB
ALBUMIN SERPL BCG-MCNC: 4.1 G/DL (ref 3.4–4.8)
ALP SERPL-CCNC: 73 U/L (ref 40–110)
ALT SERPL W P-5'-P-CCNC: 19 U/L (ref 8–55)
ANION GAP SERPL CALC-SCNC: 17 MMOL/L (ref 10–20)
ANISOCYTOSIS BLD QL SMEAR: (no result) HPF (ref 0–5)
APTT PPP: 27.8 SEC (ref 22.9–36.1)
AST SERPL-CCNC: 20 U/L (ref 5–34)
BASOPHILS # BLD AUTO: 0.1 THOU/UL (ref 0–0.2)
BASOPHILS NFR BLD AUTO: 0.3 % (ref 0–1)
BILIRUB SERPL-MCNC: 0.3 MG/DL (ref 0.2–1.2)
BUN SERPL-MCNC: 15 MG/DL (ref 9.8–20.1)
BURR CELLS BLD QL SMEAR: (no result) HPF (ref 0–1)
CALCIUM SERPL-MCNC: 9.8 MG/DL (ref 7.8–10.44)
CELLAVISION OPERATOR ID: (no result)
CHLORIDE SERPL-SCNC: 106 MMOL/L (ref 98–107)
CO2 SERPL-SCNC: 18 MMOL/L (ref 23–31)
CREAT CL PREDICTED SERPL C-G-VRATE: 0 ML/MIN (ref 70–130)
EOSINOPHIL # BLD AUTO: 0.2 THOU/UL (ref 0–0.7)
EOSINOPHIL NFR BLD AUTO: 1.3 % (ref 0–10)
GLOBULIN SER CALC-MCNC: 3.9 G/DL (ref 2.4–3.5)
GLUCOSE SERPL-MCNC: 168 MG/DL (ref 83–110)
HCT VFR BLD CALC: 30.5 % (ref 36–47)
HGB BLD-MCNC: 8.3 G/DL (ref 12–16)
INR PPP: 1.1
LYMPHOCYTES NFR BLD AUTO: 16 % (ref 21–51)
MCH RBC QN AUTO: 17.1 PG (ref 27–31)
MCV RBC AUTO: 63 FL (ref 78–98)
MICROCYTES BLD QL SMEAR: (no result) HPF (ref 0–5)
MONOCYTES # BLD AUTO: 0.8 THOU/UL (ref 0.11–0.59)
MONOCYTES NFR BLD AUTO: 4.7 % (ref 0–10)
NEUTROPHILS # BLD AUTO: 13.3 THOU/UL (ref 1.4–6.5)
NEUTROPHILS NFR BLD AUTO: 76 % (ref 42–75)
PLATELET # BLD AUTO: 556 10X3/UL (ref 130–400)
POLYCHROMASIA BLD QL SMEAR: (no result) HPF (ref 0–2)
POTASSIUM SERPL-SCNC: 3.8 MMOL/L (ref 3.5–5.1)
PROTHROMBIN TIME: 14.2 SEC (ref 12–14.7)
RBC # BLD AUTO: 4.84 MILL/UL (ref 4.2–5.4)
SODIUM SERPL-SCNC: 137 MMOL/L (ref 136–145)
WBC # BLD AUTO: 17.5 10X3/UL (ref 4.8–10.8)

## 2023-12-16 PROCEDURE — 86850 RBC ANTIBODY SCREEN: CPT

## 2023-12-16 PROCEDURE — 85610 PROTHROMBIN TIME: CPT

## 2023-12-16 PROCEDURE — 80053 COMPREHEN METABOLIC PANEL: CPT

## 2023-12-16 PROCEDURE — 93005 ELECTROCARDIOGRAM TRACING: CPT

## 2023-12-16 PROCEDURE — 76705 ECHO EXAM OF ABDOMEN: CPT

## 2023-12-16 PROCEDURE — 72170 X-RAY EXAM OF PELVIS: CPT

## 2023-12-16 PROCEDURE — 74177 CT ABD & PELVIS W/CONTRAST: CPT

## 2023-12-16 PROCEDURE — 85730 THROMBOPLASTIN TIME PARTIAL: CPT

## 2023-12-16 PROCEDURE — G0390 TRAUMA RESPONS W/HOSP CRITI: HCPCS

## 2023-12-16 PROCEDURE — 83880 ASSAY OF NATRIURETIC PEPTIDE: CPT

## 2023-12-16 PROCEDURE — 36415 COLL VENOUS BLD VENIPUNCTURE: CPT

## 2023-12-16 PROCEDURE — 86900 BLOOD TYPING SEROLOGIC ABO: CPT

## 2023-12-16 PROCEDURE — 94640 AIRWAY INHALATION TREATMENT: CPT

## 2023-12-16 PROCEDURE — 80048 BASIC METABOLIC PNL TOTAL CA: CPT

## 2023-12-16 PROCEDURE — P9016 RBC LEUKOCYTES REDUCED: HCPCS

## 2023-12-16 PROCEDURE — 71260 CT THORAX DX C+: CPT

## 2023-12-16 PROCEDURE — 96375 TX/PRO/DX INJ NEW DRUG ADDON: CPT

## 2023-12-16 PROCEDURE — 93010 ELECTROCARDIOGRAM REPORT: CPT

## 2023-12-16 PROCEDURE — 93970 EXTREMITY STUDY: CPT

## 2023-12-16 PROCEDURE — 83735 ASSAY OF MAGNESIUM: CPT

## 2023-12-16 PROCEDURE — 86901 BLOOD TYPING SEROLOGIC RH(D): CPT

## 2023-12-16 PROCEDURE — 71045 X-RAY EXAM CHEST 1 VIEW: CPT

## 2023-12-16 PROCEDURE — 83036 HEMOGLOBIN GLYCOSYLATED A1C: CPT

## 2023-12-16 PROCEDURE — 36430 TRANSFUSION BLD/BLD COMPNT: CPT

## 2023-12-16 PROCEDURE — 84100 ASSAY OF PHOSPHORUS: CPT

## 2023-12-16 PROCEDURE — 82378 CARCINOEMBRYONIC ANTIGEN: CPT

## 2023-12-16 PROCEDURE — 74018 RADEX ABDOMEN 1 VIEW: CPT

## 2023-12-16 PROCEDURE — 96374 THER/PROPH/DIAG INJ IV PUSH: CPT

## 2023-12-16 PROCEDURE — C1713 ANCHOR/SCREW BN/BN,TIS/BN: HCPCS

## 2023-12-16 PROCEDURE — 85025 COMPLETE CBC W/AUTO DIFF WBC: CPT

## 2023-12-17 LAB
ALBUMIN SERPL BCG-MCNC: 3.2 G/DL (ref 3.4–4.8)
ALP SERPL-CCNC: 61 U/L (ref 40–110)
ALT SERPL W P-5'-P-CCNC: 16 U/L (ref 8–55)
ANION GAP SERPL CALC-SCNC: 12 MMOL/L (ref 10–20)
AST SERPL-CCNC: 25 U/L (ref 5–34)
BASOPHILS # BLD AUTO: 0 THOU/UL (ref 0–0.2)
BASOPHILS NFR BLD AUTO: 0.4 % (ref 0–1)
BILIRUB SERPL-MCNC: 0.6 MG/DL (ref 0.2–1.2)
BUN SERPL-MCNC: 13 MG/DL (ref 9.8–20.1)
CALCIUM SERPL-MCNC: 9.1 MG/DL (ref 7.8–10.44)
CHLORIDE SERPL-SCNC: 108 MMOL/L (ref 98–107)
CO2 SERPL-SCNC: 23 MMOL/L (ref 23–31)
CREAT CL PREDICTED SERPL C-G-VRATE: 77 ML/MIN (ref 70–130)
EOSINOPHIL # BLD AUTO: 0.2 THOU/UL (ref 0–0.7)
EOSINOPHIL NFR BLD AUTO: 2.5 % (ref 0–10)
GLOBULIN SER CALC-MCNC: 3.4 G/DL (ref 2.4–3.5)
GLUCOSE SERPL-MCNC: 133 MG/DL (ref 83–110)
HCT VFR BLD CALC: 25.9 % (ref 36–47)
HGB BLD-MCNC: 7.2 G/DL (ref 12–16)
LYMPHOCYTES NFR BLD AUTO: 30.3 % (ref 21–51)
MCH RBC QN AUTO: 17.2 PG (ref 27–31)
MCV RBC AUTO: 61.8 FL (ref 78–98)
MONOCYTES # BLD AUTO: 0.8 THOU/UL (ref 0.11–0.59)
MONOCYTES NFR BLD AUTO: 8.6 % (ref 0–10)
NEUTROPHILS # BLD AUTO: 5.3 THOU/UL (ref 1.4–6.5)
NEUTROPHILS NFR BLD AUTO: 57.3 % (ref 42–75)
PLATELET # BLD AUTO: 430 10X3/UL (ref 130–400)
POTASSIUM SERPL-SCNC: 4.1 MMOL/L (ref 3.5–5.1)
RBC # BLD AUTO: 4.19 MILL/UL (ref 4.2–5.4)
SODIUM SERPL-SCNC: 139 MMOL/L (ref 136–145)
WBC # BLD AUTO: 9.3 10X3/UL (ref 4.8–10.8)

## 2023-12-17 PROCEDURE — 30233N1 TRANSFUSION OF NONAUTOLOGOUS RED BLOOD CELLS INTO PERIPHERAL VEIN, PERCUTANEOUS APPROACH: ICD-10-PCS | Performed by: SURGERY

## 2023-12-17 PROCEDURE — 0QS706Z REPOSITION LEFT UPPER FEMUR WITH INTRAMEDULLARY INTERNAL FIXATION DEVICE, OPEN APPROACH: ICD-10-PCS | Performed by: ORTHOPAEDIC SURGERY

## 2023-12-17 RX ADMIN — Medication SCH: at 10:13

## 2023-12-18 LAB
ANISOCYTOSIS BLD QL SMEAR: (no result) HPF (ref 0–5)
BASOPHILS # BLD AUTO: 0 THOU/UL (ref 0–0.2)
BASOPHILS NFR BLD AUTO: 0.3 % (ref 0–1)
CELLAVISION OPERATOR ID: (no result)
EOSINOPHIL # BLD AUTO: 0.1 THOU/UL (ref 0–0.7)
EOSINOPHIL NFR BLD AUTO: 0.7 % (ref 0–10)
HCT VFR BLD CALC: 30.8 % (ref 36–47)
HGB BLD-MCNC: 8.6 G/DL (ref 12–16)
LYMPHOCYTES NFR BLD AUTO: 18.8 % (ref 21–51)
MCH RBC QN AUTO: 18 PG (ref 27–31)
MCV RBC AUTO: 64.6 FL (ref 78–98)
MICROCYTES BLD QL SMEAR: (no result) HPF (ref 0–5)
MONOCYTES # BLD AUTO: 1.2 THOU/UL (ref 0.11–0.59)
MONOCYTES NFR BLD AUTO: 9.9 % (ref 0–10)
NEUTROPHILS # BLD AUTO: 8.5 THOU/UL (ref 1.4–6.5)
NEUTROPHILS NFR BLD AUTO: 69 % (ref 42–75)
PLATELET # BLD AUTO: 387 10X3/UL (ref 130–400)
POLYCHROMASIA BLD QL SMEAR: (no result) HPF (ref 0–2)
RBC # BLD AUTO: 4.77 MILL/UL (ref 4.2–5.4)
SMUDGE CELLS BLD QL SMEAR: 13.9 %
WBC # BLD AUTO: 12.3 10X3/UL (ref 4.8–10.8)

## 2023-12-18 RX ADMIN — Medication SCH MG: at 08:47

## 2023-12-19 LAB
ANION GAP SERPL CALC-SCNC: 13 MMOL/L (ref 10–20)
ANISOCYTOSIS BLD QL SMEAR: (no result) HPF (ref 0–5)
BASOPHILS # BLD AUTO: 0.1 THOU/UL (ref 0–0.2)
BASOPHILS NFR BLD AUTO: 0.5 % (ref 0–1)
BUN SERPL-MCNC: 20 MG/DL (ref 9.8–20.1)
BURR CELLS BLD QL SMEAR: (no result) HPF (ref 0–1)
CALCIUM SERPL-MCNC: 8.6 MG/DL (ref 7.8–10.44)
CELLAVISION OPERATOR ID: (no result)
CHLORIDE SERPL-SCNC: 110 MMOL/L (ref 98–107)
CO2 SERPL-SCNC: 20 MMOL/L (ref 23–31)
CREAT CL PREDICTED SERPL C-G-VRATE: 87 ML/MIN (ref 70–130)
EOSINOPHIL # BLD AUTO: 0.5 THOU/UL (ref 0–0.7)
EOSINOPHIL NFR BLD AUTO: 4.4 % (ref 0–10)
GLUCOSE SERPL-MCNC: 124 MG/DL (ref 83–110)
HCT VFR BLD CALC: 26.2 % (ref 36–47)
HGB BLD-MCNC: 7.4 G/DL (ref 12–16)
LYMPHOCYTES NFR BLD AUTO: 23.9 % (ref 21–51)
MCH RBC QN AUTO: 18.1 PG (ref 27–31)
MCV RBC AUTO: 64.1 FL (ref 78–98)
MICROCYTES BLD QL SMEAR: (no result) HPF (ref 0–5)
MONOCYTES # BLD AUTO: 0.7 THOU/UL (ref 0.11–0.59)
MONOCYTES NFR BLD AUTO: 6.1 % (ref 0–10)
NEUTROPHILS # BLD AUTO: 7.4 THOU/UL (ref 1.4–6.5)
NEUTROPHILS NFR BLD AUTO: 63.2 % (ref 42–75)
PLATELET # BLD AUTO: 403 10X3/UL (ref 130–400)
POLYCHROMASIA BLD QL SMEAR: (no result) HPF (ref 0–2)
POTASSIUM SERPL-SCNC: 4 MMOL/L (ref 3.5–5.1)
RBC # BLD AUTO: 4.09 MILL/UL (ref 4.2–5.4)
SODIUM SERPL-SCNC: 139 MMOL/L (ref 136–145)
WBC # BLD AUTO: 11.7 10X3/UL (ref 4.8–10.8)

## 2023-12-19 RX ADMIN — Medication SCH MG: at 08:52

## 2023-12-19 RX ADMIN — ONDANSETRON PRN MG: 2 INJECTION INTRAMUSCULAR; INTRAVENOUS at 23:07

## 2023-12-19 RX ADMIN — DOCUSATE SODIUM 50 MG AND SENNOSIDES 8.6 MG SCH: 8.6; 5 TABLET, FILM COATED ORAL at 21:26

## 2023-12-19 RX ADMIN — ONDANSETRON PRN MG: 2 INJECTION INTRAMUSCULAR; INTRAVENOUS at 04:18

## 2023-12-19 RX ADMIN — ONDANSETRON PRN MG: 2 INJECTION INTRAMUSCULAR; INTRAVENOUS at 10:08

## 2023-12-20 LAB
ANION GAP SERPL CALC-SCNC: 11 MMOL/L (ref 10–20)
BASOPHILS # BLD AUTO: 0.1 THOU/UL (ref 0–0.2)
BASOPHILS NFR BLD AUTO: 0.5 % (ref 0–1)
BUN SERPL-MCNC: 20 MG/DL (ref 9.8–20.1)
CALCIUM SERPL-MCNC: 8.4 MG/DL (ref 7.8–10.44)
CHLORIDE SERPL-SCNC: 111 MMOL/L (ref 98–107)
CO2 SERPL-SCNC: 21 MMOL/L (ref 23–31)
CREAT CL PREDICTED SERPL C-G-VRATE: 85 ML/MIN (ref 70–130)
EOSINOPHIL # BLD AUTO: 0.4 THOU/UL (ref 0–0.7)
EOSINOPHIL NFR BLD AUTO: 3.9 % (ref 0–10)
GLUCOSE SERPL-MCNC: 146 MG/DL (ref 83–110)
HCT VFR BLD CALC: 23.8 % (ref 36–47)
HGB BLD-MCNC: 6.8 G/DL (ref 12–16)
LYMPHOCYTES NFR BLD AUTO: 19.1 % (ref 21–51)
MAGNESIUM SERPL-MCNC: 1.9 MG/DL (ref 1.6–2.6)
MCH RBC QN AUTO: 18.5 PG (ref 27–31)
MCV RBC AUTO: 64.9 FL (ref 78–98)
MONOCYTES # BLD AUTO: 0.7 THOU/UL (ref 0.11–0.59)
MONOCYTES NFR BLD AUTO: 6.2 % (ref 0–10)
NEUTROPHILS # BLD AUTO: 7.8 THOU/UL (ref 1.4–6.5)
NEUTROPHILS NFR BLD AUTO: 68.8 % (ref 42–75)
PLATELET # BLD AUTO: 401 10X3/UL (ref 130–400)
POTASSIUM SERPL-SCNC: 4 MMOL/L (ref 3.5–5.1)
RBC # BLD AUTO: 3.67 MILL/UL (ref 4.2–5.4)
SODIUM SERPL-SCNC: 139 MMOL/L (ref 136–145)
WBC # BLD AUTO: 11.4 10X3/UL (ref 4.8–10.8)

## 2023-12-20 RX ADMIN — DOCUSATE SODIUM 50 MG AND SENNOSIDES 8.6 MG SCH TAB: 8.6; 5 TABLET, FILM COATED ORAL at 09:55

## 2023-12-20 RX ADMIN — ACETAMINOPHEN AND CODEINE PHOSPHATE SCH TAB: 300; 30 TABLET ORAL at 15:58

## 2023-12-20 RX ADMIN — ONDANSETRON PRN MG: 2 INJECTION INTRAMUSCULAR; INTRAVENOUS at 08:25

## 2023-12-20 RX ADMIN — Medication SCH: at 09:00

## 2023-12-20 RX ADMIN — ONDANSETRON PRN MG: 2 INJECTION INTRAMUSCULAR; INTRAVENOUS at 16:03

## 2023-12-20 RX ADMIN — ONDANSETRON PRN MG: 2 INJECTION INTRAMUSCULAR; INTRAVENOUS at 21:01

## 2023-12-20 RX ADMIN — ACETAMINOPHEN AND CODEINE PHOSPHATE SCH TAB: 300; 30 TABLET ORAL at 09:53

## 2023-12-20 RX ADMIN — DOCUSATE SODIUM 50 MG AND SENNOSIDES 8.6 MG SCH: 8.6; 5 TABLET, FILM COATED ORAL at 21:04

## 2023-12-21 LAB
ALBUMIN SERPL BCG-MCNC: 3 G/DL (ref 3.4–4.8)
ALP SERPL-CCNC: 57 U/L (ref 40–110)
ALT SERPL W P-5'-P-CCNC: 13 U/L (ref 8–55)
ANION GAP SERPL CALC-SCNC: 12 MMOL/L (ref 10–20)
AST SERPL-CCNC: 24 U/L (ref 5–34)
BASOPHILS # BLD AUTO: 0.1 THOU/UL (ref 0–0.2)
BASOPHILS NFR BLD AUTO: 0.4 % (ref 0–1)
BILIRUB SERPL-MCNC: 0.7 MG/DL (ref 0.2–1.2)
BUN SERPL-MCNC: 17 MG/DL (ref 9.8–20.1)
CALCIUM SERPL-MCNC: 8.6 MG/DL (ref 7.8–10.44)
CHLORIDE SERPL-SCNC: 107 MMOL/L (ref 98–107)
CO2 SERPL-SCNC: 21 MMOL/L (ref 23–31)
CREAT CL PREDICTED SERPL C-G-VRATE: 91 ML/MIN (ref 70–130)
EOSINOPHIL # BLD AUTO: 0.5 THOU/UL (ref 0–0.7)
EOSINOPHIL NFR BLD AUTO: 3.4 % (ref 0–10)
GLOBULIN SER CALC-MCNC: 3.3 G/DL (ref 2.4–3.5)
GLUCOSE SERPL-MCNC: 130 MG/DL (ref 83–110)
HCT VFR BLD CALC: 27.8 % (ref 36–47)
HGB BLD-MCNC: 7.9 G/DL (ref 12–16)
LYMPHOCYTES NFR BLD AUTO: 18.2 % (ref 21–51)
MCH RBC QN AUTO: 18.9 PG (ref 27–31)
MCV RBC AUTO: 66.5 FL (ref 78–98)
MICROCYTES BLD QL SMEAR: (no result) (100X)
MONOCYTES # BLD AUTO: 0.8 THOU/UL (ref 0.11–0.59)
MONOCYTES NFR BLD AUTO: 5.7 % (ref 0–10)
NEUTROPHILS # BLD AUTO: 9.5 THOU/UL (ref 1.4–6.5)
NEUTROPHILS NFR BLD AUTO: 71.1 % (ref 42–75)
OVALOCYTES BLD QL SMEAR: (no result) (100X)
PLATELET # BLD AUTO: 395 10X3/UL (ref 130–400)
POLYCHROMASIA BLD QL SMEAR: (no result) (100X)
POTASSIUM SERPL-SCNC: 4 MMOL/L (ref 3.5–5.1)
RBC # BLD AUTO: 4.18 MILL/UL (ref 4.2–5.4)
SODIUM SERPL-SCNC: 136 MMOL/L (ref 136–145)
WBC # BLD AUTO: 13.4 10X3/UL (ref 4.8–10.8)

## 2023-12-21 RX ADMIN — DOCUSATE SODIUM 50 MG AND SENNOSIDES 8.6 MG SCH TAB: 8.6; 5 TABLET, FILM COATED ORAL at 20:36

## 2023-12-21 RX ADMIN — Medication SCH MG: at 09:22

## 2023-12-21 RX ADMIN — DOCUSATE SODIUM 50 MG AND SENNOSIDES 8.6 MG SCH TAB: 8.6; 5 TABLET, FILM COATED ORAL at 09:22

## 2023-12-22 VITALS — SYSTOLIC BLOOD PRESSURE: 118 MMHG | TEMPERATURE: 97.8 F | DIASTOLIC BLOOD PRESSURE: 71 MMHG

## 2023-12-22 LAB
ANION GAP SERPL CALC-SCNC: 12 MMOL/L (ref 10–20)
ANISOCYTOSIS BLD QL SMEAR: (no result) HPF (ref 0–5)
BASOPHILS # BLD AUTO: 0.1 THOU/UL (ref 0–0.2)
BASOPHILS NFR BLD AUTO: 0.5 % (ref 0–1)
BUN SERPL-MCNC: 17 MG/DL (ref 9.8–20.1)
CALCIUM SERPL-MCNC: 8.6 MG/DL (ref 7.8–10.44)
CELLAVISION OPERATOR ID: (no result)
CHLORIDE SERPL-SCNC: 105 MMOL/L (ref 98–107)
CO2 SERPL-SCNC: 23 MMOL/L (ref 23–31)
CREAT CL PREDICTED SERPL C-G-VRATE: 83 ML/MIN (ref 70–130)
EOSINOPHIL # BLD AUTO: 0.6 THOU/UL (ref 0–0.7)
EOSINOPHIL NFR BLD AUTO: 3.9 % (ref 0–10)
GLUCOSE SERPL-MCNC: 177 MG/DL (ref 83–110)
HCT VFR BLD CALC: 29.1 % (ref 36–47)
HGB BLD-MCNC: 8.3 G/DL (ref 12–16)
LYMPHOCYTES NFR BLD AUTO: 13.7 % (ref 21–51)
MACROCYTES BLD QL SMEAR: (no result) HPF (ref 0–5)
MCH RBC QN AUTO: 19.1 PG (ref 27–31)
MCV RBC AUTO: 67.1 FL (ref 78–98)
MICROCYTES BLD QL SMEAR: (no result) HPF (ref 0–5)
MONOCYTES # BLD AUTO: 1 THOU/UL (ref 0.11–0.59)
MONOCYTES NFR BLD AUTO: 6.8 % (ref 0–10)
NEUTROPHILS # BLD AUTO: 10.5 THOU/UL (ref 1.4–6.5)
NEUTROPHILS NFR BLD AUTO: 74 % (ref 42–75)
OVALOCYTES BLD QL SMEAR: (no result) HPF (ref 0–1)
PLATELET # BLD AUTO: 430 10X3/UL (ref 130–400)
POLYCHROMASIA BLD QL SMEAR: (no result) HPF (ref 0–2)
POTASSIUM SERPL-SCNC: 3.5 MMOL/L (ref 3.5–5.1)
RBC # BLD AUTO: 4.34 MILL/UL (ref 4.2–5.4)
SODIUM SERPL-SCNC: 136 MMOL/L (ref 136–145)
WBC # BLD AUTO: 14.2 10X3/UL (ref 4.8–10.8)

## 2023-12-22 RX ADMIN — Medication SCH MG: at 09:13

## 2023-12-22 RX ADMIN — DOCUSATE SODIUM 50 MG AND SENNOSIDES 8.6 MG SCH TAB: 8.6; 5 TABLET, FILM COATED ORAL at 09:08
